# Patient Record
Sex: MALE | Race: WHITE | Employment: FULL TIME | URBAN - METROPOLITAN AREA
[De-identification: names, ages, dates, MRNs, and addresses within clinical notes are randomized per-mention and may not be internally consistent; named-entity substitution may affect disease eponyms.]

---

## 2018-06-06 ENCOUNTER — HOSPITAL ENCOUNTER (OUTPATIENT)
Age: 53
Discharge: HOME | End: 2018-06-06
Payer: COMMERCIAL

## 2018-06-06 VITALS
RESPIRATION RATE: 18 BRPM | HEART RATE: 67 BPM | DIASTOLIC BLOOD PRESSURE: 95 MMHG | OXYGEN SATURATION: 100 % | TEMPERATURE: 98 F | SYSTOLIC BLOOD PRESSURE: 161 MMHG

## 2018-06-06 DIAGNOSIS — H61.22 IMPACTED CERUMEN OF LEFT EAR: Primary | ICD-10-CM

## 2018-06-06 PROCEDURE — 99202 OFFICE O/P NEW SF 15 MIN: CPT

## 2018-06-06 NOTE — ED PROVIDER NOTES
Patient presents with:  Ear Problem Pain (neurosensory)      HPI:     Scotty Zuñiga is a 47year old male who presents with a chief complaint of his left ear feeling clogged for the past week. He states he did swim a week ago.   He denies any discomfor deficits.     MDM/Assessment/Plan:   Orders for this encounter:      Orders Placed This Encounter      Ear wax removal Once          Order Comments:              Ear irrigation    Labs performed this visit:  No results found for this or any previous visit (

## 2021-02-28 NOTE — ED PROVIDER NOTES
Patient Seen in: [facility]      History   Patient presents with:  Fatigue    Stated Complaint: tired not feeling well    HPI/Subjective:   HPI    This is a 61-year-old male with past medical history of anxiety not currently seeing a psychiat nursing note reviewed. Constitutional:       Appearance: Normal appearance. HENT:      Head: Normocephalic.       Right Ear: External ear normal.      Left Ear: External ear normal.      Nose: Nose normal.      Mouth/Throat:      Mouth: Mucous membranes repeat blood pressure as he may have undiagnosed high blood pressure. Discussed following up with [facility] for further management of anxiety and stress.   Discussed prescribing Ativan just to take at night as needed for anxiety

## 2021-02-28 NOTE — ED INITIAL ASSESSMENT (HPI)
Feeling fatigued on and off for 2 weeks, diff sleeping, decreased appetite, increased anxiety, denies si/hi, denies new stressors, denies cough, no fever, denies known sick contact with covid

## 2021-04-14 ENCOUNTER — OFFICE VISIT (OUTPATIENT)
Dept: FAMILY MEDICINE CLINIC | Facility: CLINIC | Age: 56
End: 2021-04-14
Payer: COMMERCIAL

## 2021-04-14 VITALS
HEART RATE: 77 BPM | BODY MASS INDEX: 29.78 KG/M2 | DIASTOLIC BLOOD PRESSURE: 90 MMHG | WEIGHT: 208 LBS | HEIGHT: 70 IN | SYSTOLIC BLOOD PRESSURE: 150 MMHG

## 2021-04-14 DIAGNOSIS — F32.A ANXIETY AND DEPRESSION: ICD-10-CM

## 2021-04-14 DIAGNOSIS — Z12.5 PROSTATE CANCER SCREENING: ICD-10-CM

## 2021-04-14 DIAGNOSIS — F41.9 ANXIETY AND DEPRESSION: ICD-10-CM

## 2021-04-14 DIAGNOSIS — Z13.0 SCREENING FOR DEFICIENCY ANEMIA: ICD-10-CM

## 2021-04-14 DIAGNOSIS — Z12.11 COLON CANCER SCREENING: ICD-10-CM

## 2021-04-14 DIAGNOSIS — I10 ESSENTIAL HYPERTENSION: Primary | ICD-10-CM

## 2021-04-14 DIAGNOSIS — Z00.00 WELLNESS EXAMINATION: ICD-10-CM

## 2021-04-14 PROCEDURE — 3080F DIAST BP >= 90 MM HG: CPT | Performed by: FAMILY MEDICINE

## 2021-04-14 PROCEDURE — 3077F SYST BP >= 140 MM HG: CPT | Performed by: FAMILY MEDICINE

## 2021-04-14 PROCEDURE — 3008F BODY MASS INDEX DOCD: CPT | Performed by: FAMILY MEDICINE

## 2021-04-14 PROCEDURE — 99386 PREV VISIT NEW AGE 40-64: CPT | Performed by: FAMILY MEDICINE

## 2021-04-14 NOTE — PROGRESS NOTES
HPI:   Ricardo River is a 48year old male who presents for an Annual Health Visit.    Patient here also to establish care, he has history of anxiety, has remote history of symptoms similar to PTSD, was seen psychiatry in the past, use Wellbutrin whic REVIEW OF SYSTEMS:     Review of Systems   Constitutional: Negative. Respiratory: Negative. Cardiovascular: Negative. Gastrointestinal: Negative. Skin: Negative. Neurological: Negative.     Psychiatric/Behavioral: The patient is nervous with a sleep, recommended to start magnesium supplementation that can assist with anxiety and a sleep as well and monitor response.     Discussed options to start treatment for high blood pressure but as level has been trending outpatient would prefer to [facility] CRISTINA LEW occult blood test, or  · Yearly fecal immunochemical test every year, or  · Stool DNA test, every 3 years  If you choose a test other than a colonoscopy and have an abnormal test result, you will need to follow-up with a colonoscopy.  Screening recommendati cancer Starting at age 48, talk with your healthcare provider about risks and benefits of testing with digital rectal exam (POP) and prostate-specific antigen (PSA) screening At routine exams if you decide to be tested.    Syphilis Men at increased risk for risk for infection PCV13: 1 dose ages 23 to 72 (protects against 13 types of pneumococcal bacteria)  PPSV23: 1 to 2 doses through age 59, (protects against 23 types of pneumococcal bacteria)   Tetanus/diphtheria/pertussis (Td/Tdap) booster All men in this cause All men in this age group Every exam   Liana last reviewed this educational content on 5/1/2019  © 5486-0935 [employer]. All rights reserved. This information is not intended as a substitute for professional medical care.  Always fol

## (undated) NOTE — LETTER
01/14/22    Cristhian Mayaing  [address] [facility]      Dear Edgar Huynh,    [address] [facility] records indicate that you have outstanding lab work and or testing that was ordered for you and has not yet been completed:  Orders Placed This Encounter      Yu

## (undated) NOTE — LETTER
10/14/21    Stephan Santoyo  [address] [facility]      Dear Camelia Greer,    [address] records indicate that you have outstanding lab work and or testing that was ordered for you and has not yet been completed:  Orders Placed This Encounter      Yu

## (undated) NOTE — LETTER
08/14/21      Ezella Leak  [address] [facility]      Dear Giovani Lucio,    [address] [facility] records indicate that you have outstanding lab work and or testing that was ordered for you and has not yet been completed:  Orders Placed This Encounter